# Patient Record
Sex: MALE | Race: OTHER | Employment: UNEMPLOYED | URBAN - METROPOLITAN AREA
[De-identification: names, ages, dates, MRNs, and addresses within clinical notes are randomized per-mention and may not be internally consistent; named-entity substitution may affect disease eponyms.]

---

## 2024-01-08 ENCOUNTER — OFFICE VISIT (OUTPATIENT)
Dept: URGENT CARE | Age: 44
End: 2024-01-08
Payer: MEDICARE

## 2024-01-08 VITALS
RESPIRATION RATE: 18 BRPM | OXYGEN SATURATION: 98 % | HEART RATE: 56 BPM | TEMPERATURE: 98.7 F | SYSTOLIC BLOOD PRESSURE: 152 MMHG | DIASTOLIC BLOOD PRESSURE: 102 MMHG

## 2024-01-08 DIAGNOSIS — J02.9 SORE THROAT: Primary | ICD-10-CM

## 2024-01-08 LAB — S PYO AG THROAT QL: NEGATIVE

## 2024-01-08 PROCEDURE — 99213 OFFICE O/P EST LOW 20 MIN: CPT | Performed by: NURSE PRACTITIONER

## 2024-01-08 PROCEDURE — 87880 STREP A ASSAY W/OPTIC: CPT | Performed by: NURSE PRACTITIONER

## 2024-01-08 PROCEDURE — 87070 CULTURE OTHR SPECIMN AEROBIC: CPT | Performed by: NURSE PRACTITIONER

## 2024-01-08 RX ORDER — AMLODIPINE BESYLATE 2.5 MG/1
2.5 TABLET ORAL DAILY
COMMUNITY

## 2024-01-08 NOTE — PROGRESS NOTES
[facility]        NAME: Bolivar Merrill is a 43 y.o. male  : 1980    MRN: [id number]  DATE: 2024  TIME: 11:22 AM    Assessment and Plan   Sore throat [J02.9]  1. Sore throat  POCT rapid strepA    Throat culture            Patient Instructions     Rapid strep is negative; will send for culture  Dayquil OTC prn for symptoms   Tylenol prn for aches/pain   Follow up with PCP in 3-5 days.  Proceed to  ER if symptoms worsen.    Chief Complaint     Chief Complaint   Patient presents with    Earache    Sore Throat     Symptoms started a few days ago.     Cough         History of Present Illness       HPI  Reports to the clinic for sore throat and cough.  Duration 3 to 4 days.  Also ear discomfort.  Treated for COVID about a month ago    Review of Systems   Review of Systems   Constitutional:  Negative for fever.   HENT:  Positive for ear pain and sore throat. Negative for sinus pain.    Respiratory:  Positive for cough.    Cardiovascular:  Negative for chest pain.   Gastrointestinal:  Negative for diarrhea and vomiting.   Neurological:  Negative for light-headedness and headaches.         Current Medications       Current Outpatient Medications:     amLODIPine (NORVASC) 2.5 mg tablet, Take 2.5 mg by mouth daily, Disp: , Rfl:     Current Allergies     Allergies as of 2024    (No Known Allergies)            The following portions of the patient's history were reviewed and updated as appropriate: allergies, current medications, past family history, past medical history, past social history, past surgical history and problem list.     No past medical history on file.    No past surgical history on file.    No family history on file.      Medications have been verified.        Objective   BP (!) 152/102   Pulse 56   Temp 98.7 °F (37.1 °C)   Resp 18   SpO2 98%   No LMP for male patient.       Physical Exam     Physical Exam  Constitutional:       Appearance: He is not ill-appearing or  diaphoretic.   HENT:      Right Ear: Tympanic membrane normal.      Left Ear: Tympanic membrane normal.      Nose: Rhinorrhea present.      Mouth/Throat:      Pharynx: No posterior oropharyngeal erythema.      Tonsils: No tonsillar exudate. 0 on the right. 0 on the left.   Cardiovascular:      Rate and Rhythm: Regular rhythm.   Pulmonary:      Breath sounds: Normal breath sounds.   Lymphadenopathy:      Cervical: No cervical adenopathy.

## 2024-01-10 LAB — BACTERIA THROAT CULT: NORMAL

## 2024-03-11 ENCOUNTER — OFFICE VISIT (OUTPATIENT)
Dept: URGENT CARE | Facility: CLINIC | Age: 44
End: 2024-03-11
Payer: MEDICARE

## 2024-03-11 VITALS
WEIGHT: 216 LBS | HEIGHT: 70 IN | DIASTOLIC BLOOD PRESSURE: 100 MMHG | RESPIRATION RATE: 18 BRPM | SYSTOLIC BLOOD PRESSURE: 142 MMHG | TEMPERATURE: 97.1 F | BODY MASS INDEX: 30.92 KG/M2 | HEART RATE: 69 BPM | OXYGEN SATURATION: 99 %

## 2024-03-11 DIAGNOSIS — H66.001 NON-RECURRENT ACUTE SUPPURATIVE OTITIS MEDIA OF RIGHT EAR WITHOUT SPONTANEOUS RUPTURE OF TYMPANIC MEMBRANE: Primary | ICD-10-CM

## 2024-03-11 PROCEDURE — 99213 OFFICE O/P EST LOW 20 MIN: CPT

## 2024-03-11 RX ORDER — AMOXICILLIN 875 MG/1
875 TABLET, COATED ORAL 2 TIMES DAILY
Qty: 14 TABLET | Refills: 0 | Status: SHIPPED | OUTPATIENT
Start: 2024-03-11 | End: 2024-03-18

## 2024-03-11 NOTE — PATIENT INSTRUCTIONS
Warm compresses to the ear  Tylenol or Motrin for pain and fever  Take the antibiotic as prescribed twice a day for 7 days

## 2024-03-11 NOTE — PROGRESS NOTES
[facility]        NAME: Tigre Keene is a 43 y.o. male  : 1980    MRN: [id number]  DATE: 2024  TIME: 11:45 AM    Assessment and Plan   Non-recurrent acute suppurative otitis media of right ear without spontaneous rupture of tympanic membrane [H66.001]  1. Non-recurrent acute suppurative otitis media of right ear without spontaneous rupture of tympanic membrane  amoxicillin (AMOXIL) 875 mg tablet            Patient Instructions   Warm compresses to the ear  Tylenol or Motrin for pain and fever  Take the antibiotic as prescribed twice a day for 7 days  Follow up with PCP in 3-5 days.  Proceed to  ER if symptoms worsen.    If tests have been performed at [facility], our office will contact you with results if changes need to be made to the care plan discussed with you at the visit.  [facility]    Chief Complaint     Chief Complaint   Patient presents with    Earache     X1 day w/ right ear pain, denied fever/ sore throat         History of Present Illness       This is a 43-year-old male who presents today with right ear pain that started yesterday.  He denies any fever, chills,  sore throat, or nasal congestion.  He denies any drainage in the ear.  He has not been exposed to anyone else who is sick    Earache   Pertinent negatives include no coughing, ear discharge, headaches or sore throat.       Review of Systems   Review of Systems   Constitutional: Negative.    HENT:  Positive for ear pain. Negative for ear discharge and sore throat.    Eyes: Negative.    Respiratory: Negative.  Negative for cough and shortness of breath.    Cardiovascular:  Negative for chest pain.   Genitourinary: Negative.    Neurological: Negative.  Negative for headaches.         Current Medications       Current Outpatient Medications:     amoxicillin (AMOXIL) 875 mg tablet, Take 1 tablet (875 mg total) by mouth 2 (two) times a day for 7 days, Disp: 14 tablet, Rfl:  "0    amLODIPine (NORVASC) 2.5 mg tablet, Take 2.5 mg by mouth daily, Disp: , Rfl:     Current Allergies     Allergies as of 03/11/2024 - Reviewed 03/11/2024   Allergen Reaction Noted    Dog epithelium Other (See Comments) 06/07/2012            The following portions of the patient's history were reviewed and updated as appropriate: allergies, current medications, past family history, past medical history, past social history, past surgical history and problem list.     History reviewed. No pertinent past medical history.    History reviewed. No pertinent surgical history.    History reviewed. No pertinent family history.      Medications have been verified.        Objective   /100   Pulse 69   Temp (!) 97.1 °F (36.2 °C)   Resp 18   Ht 5' 10\" (1.778 m)   Wt 98 kg (216 lb)   SpO2 99%   BMI 30.99 kg/m²   No LMP for male patient.       Physical Exam     Physical Exam  Constitutional:       Appearance: Normal appearance. He is normal weight.   HENT:      Head: Normocephalic and atraumatic.      Right Ear: External ear normal. There is no impacted cerumen.      Left Ear: Tympanic membrane, ear canal and external ear normal. There is no impacted cerumen.      Nose: Nose normal. No congestion.      Mouth/Throat:      Mouth: Mucous membranes are moist.      Pharynx: Oropharynx is clear. No oropharyngeal exudate or posterior oropharyngeal erythema.   Eyes:      Conjunctiva/sclera: Conjunctivae normal.      Pupils: Pupils are equal, round, and reactive to light.   Cardiovascular:      Rate and Rhythm: Normal rate and regular rhythm.      Pulses: Normal pulses.      Heart sounds: Normal heart sounds.   Pulmonary:      Effort: Pulmonary effort is normal.      Breath sounds: Normal breath sounds.   Abdominal:      General: Abdomen is flat. Bowel sounds are normal.   Musculoskeletal:         General: Normal range of motion.      Cervical back: Normal range of motion and neck supple.   Skin:     General: Skin is warm " and dry.      Capillary Refill: Capillary refill takes less than 2 seconds.   Neurological:      General: No focal deficit present.      Mental Status: He is alert and oriented to person, place, and time. Mental status is at baseline.   Psychiatric:         Mood and Affect: Mood normal.         Thought Content: Thought content normal.         Judgment: Judgment normal.